# Patient Record
Sex: MALE | Race: BLACK OR AFRICAN AMERICAN | Employment: PART TIME | URBAN - METROPOLITAN AREA
[De-identification: names, ages, dates, MRNs, and addresses within clinical notes are randomized per-mention and may not be internally consistent; named-entity substitution may affect disease eponyms.]

---

## 2021-10-21 ENCOUNTER — HOSPITAL ENCOUNTER (EMERGENCY)
Age: 66
Discharge: HOME | End: 2021-10-21
Attending: EMERGENCY MEDICINE
Payer: MEDICARE

## 2021-10-21 ENCOUNTER — APPOINTMENT (OUTPATIENT)
Dept: ULTRASOUND IMAGING | Age: 66
End: 2021-10-21
Attending: EMERGENCY MEDICINE
Payer: MEDICARE

## 2021-10-21 VITALS
WEIGHT: 308.42 LBS | SYSTOLIC BLOOD PRESSURE: 165 MMHG | HEART RATE: 65 BPM | HEIGHT: 66 IN | TEMPERATURE: 98.2 F | BODY MASS INDEX: 49.57 KG/M2 | OXYGEN SATURATION: 97 % | DIASTOLIC BLOOD PRESSURE: 93 MMHG | RESPIRATION RATE: 20 BRPM

## 2021-10-21 DIAGNOSIS — M79.604 ACUTE LEG PAIN, RIGHT: Primary | ICD-10-CM

## 2021-10-21 DIAGNOSIS — M79.89 RIGHT LEG SWELLING: ICD-10-CM

## 2021-10-21 LAB
GLUCOSE BLD STRIP.AUTO-MCNC: 88 MG/DL (ref 65–117)
SERVICE CMNT-IMP: NORMAL

## 2021-10-21 PROCEDURE — 93971 EXTREMITY STUDY: CPT

## 2021-10-21 PROCEDURE — 99283 EMERGENCY DEPT VISIT LOW MDM: CPT

## 2021-10-21 PROCEDURE — 82962 GLUCOSE BLOOD TEST: CPT

## 2021-10-21 RX ORDER — PREDNISONE 10 MG/1
60 TABLET ORAL
Qty: 27 TABLET | Refills: 0 | Status: SHIPPED | OUTPATIENT
Start: 2021-10-21

## 2021-10-21 NOTE — ED PROVIDER NOTES
EMERGENCY DEPARTMENT HISTORY AND PHYSICAL EXAM      Date: 10/21/2021  Patient Name: Yessi Giles    History of Presenting Illness     Chief Complaint   Patient presents with    Leg Pain     Right leg pain and swelling for a couple of years that has gotten worse. He went to see his PCP and she gave him some cream to rub on his legs but it isn't helping. History Provided By: Patient    HPI: Alvaro Giles, 73 y.o. male presents to the ED with complaint of ongoing right leg pain and swelling for a couple years now that is progressively gotten worse. He says he has been to a number of doctors but there remedies have not helped resolve his issue. He denies any fever, any new leg trauma and says that the right leg is persistently been swollen and painful. He drove a long-haul truck for approximately 47 years and said that his pain and swelling in his leg progressively worse on the right side. He also said he had a distant fall onto the right buttock and has had some pain in his leg ever since. He says he has previously been evaluated for blood clot but has been negative at that time and is no personal or family history of PE. He does not know of any circulation issues in his leg. He is using a cane now to help take some of the weight off his leg as he walks to help with the pain. There are no other complaints, changes, or physical findings at this time. PCP: David Edwrads MD    No current facility-administered medications on file prior to encounter. No current outpatient medications on file prior to encounter. Past History     Past Medical History:  No past medical history on file. Past Surgical History:  No past surgical history on file. Family History:  No family history on file.     Social History:  Social History     Tobacco Use    Smoking status: Not on file   Substance Use Topics    Alcohol use: Not on file    Drug use: Not on file       Allergies:  No Known Allergies      Review of Systems   Review of Systems   Constitutional: Negative for activity change and appetite change. HENT: Negative. Respiratory: Negative for shortness of breath. Cardiovascular: Negative for chest pain. Gastrointestinal: Negative for abdominal pain. Genitourinary: Negative for difficulty urinating. Musculoskeletal: Negative for back pain and neck pain. Neurological: Negative for dizziness and light-headedness. Hematological: Negative for adenopathy. All other systems reviewed and are negative. Physical Exam   Physical Exam   Vital signs and nursing notes reviewed    CONSTITUTIONAL: Alert, in mild distress; well-developed; well-nourished. Sitting in wheelchair with cane on his side. Greater than 30. HEAD:  Normocephalic, atraumatic  EYES: PERRL; EOM's intact. ENTM: Nose: no rhinorrhea; Throat: no erythema or exudate, mucous membranes moist  Neck:  Supple. trachea is midline. No JVD. RESP: Chest clear, equal breath sounds. - W/R/R  CV: S1 and S2 WNL; No murmurs, gallops or rubs. 2+ radial and DP pulses bilaterally. GI: non-distended, normal bowel sounds, abdomen soft and non-tender. No masses or organomegaly. : No costo-vertebral angle tenderness. BACK:  Non-tender, normal appearance  UPPER EXT:  Normal inspection. no joint or soft tissue swelling  LOWER EXT: Left leg with 1+ edema, right leg with 2+ edema, no overlying redness or induration, hair loss in the lower one third of the lower extremity, intact 2+ DP pulse, no joint deformity or effusions noted in the foot ankle or knee. No focal tenderness in the calf or palpable subcu air. Negative straight leg test.  NEURO: Alert and oriented x3, 5/5 strength and light touch sensation intact in bilateral upper and lower extremities. SKIN: No rashes;  Warm and dry  PSYCH: Normal mood, normal affect    Diagnostic Study Results     Labs -     Recent Results (from the past 12 hour(s))   GLUCOSE, POC    Collection Time: 10/21/21 12:32 PM   Result Value Ref Range    Glucose (POC) 88 65 - 117 mg/dL    Performed by Cassie Bolaños        Radiologic Studies -   DUPLEX LOWER EXT VENOUS RIGHT   Final Result negative for DVT. CT Results  (Last 48 hours)    None        CXR Results  (Last 48 hours)    None          Medical Decision Making   I am the first provider for this patient. I reviewed the vital signs, available nursing notes, past medical history, past surgical history, family history and social history. Vital Signs-Reviewed the patient's vital signs. Patient Vitals for the past 12 hrs:   Temp Pulse Resp BP SpO2   10/21/21 1129 98.2 °F (36.8 °C) 65 20 (!) 165/93 97 %   10/21/21 1124     98 %   10/21/21 1055 98.2 °F (36.8 °C) 74 18 (!) 180/95 95 %         Records Reviewed: Nursing Notes    Provider Notes (Medical Decision Making):   67-year-old male with acute on chronic right leg pain. Differential includes venous insufficiency, early arterial insufficiency but with no red flags for vascular emergency, rule out DVT, not suspicious for gout, sciatica, neuropathy. DVT study here is negative and patient screens negative for diabetes on point-of-care glucose check. Patient would benefit both from her neurology follow-up which she does have planned already in February and can try a short course of prednisone to reduce inflammation. Discussed compression stockings, close follow-up with primary care doctor as may need work-up for neuropathy which can be started in the primary care space. ED Course:   Initial assessment performed. The patients presenting problems have been discussed, and they are in agreement with the care plan formulated and outlined with them. I have encouraged them to ask questions as they arise throughout their visit. Disposition:  Discharge    Discharge Note:  2:07 PM  The pt is ready for discharge.  The pt's signs, symptoms, diagnosis, and discharge instructions have been discussed and pt has conveyed their understanding. The pt is to follow up as recommended or return to ER should their symptoms worsen. Plan has been discussed and pt is in agreement. DISCHARGE PLAN:  1. Discharge Medication List as of 10/21/2021  2:07 PM        2. Follow-up Information     Follow up With Specialties Details Why Contact Info    [facility] EMERGENCY DEPT Emergency Medicine  If symptoms worsen including fever, uncontrolled pain or other new concerning symptoms. [address] [place] [address]  [phone]        3. Return to ED if worse     Diagnosis     Clinical Impression:   1. Acute leg pain, right    2. Right leg swelling        Attestations:    Sourav Alarcon MD    Please note that this dictation was completed with [employer], the computer voice recognition software. Quite often unanticipated grammatical, syntax, homophones, and other interpretive errors are inadvertently transcribed by the computer software. Please disregard these errors. Please excuse any errors that have escaped final proofreading. Thank you.

## 2021-10-21 NOTE — DISCHARGE INSTRUCTIONS
You were seen here in the emergency department for ongoing right leg pain and swelling. Your evaluation here was thankfully reassuring with no evidence of a dangerous DVT/blood clot in your right leg. Your blood sugar was also good today at 88. This is normal.  As we discussed, the pain in your leg could be from neuropathy, the presence of chronic swelling from veins that are not working properly, along with some potential of decreased blood flow to your leg though I am less suspicious for this. Your pain could also be from nerves traveling down your leg and to help with this I am sending a prescription for prednisone to your local pharmacy. Please take as directed for the next 7 days. You for letting us take care of you and be sure to follow-up with your primary care doctor as well as your planned neurology appointment. You can always call them to see if they have any cancellations he can get in sooner.

## 2021-10-21 NOTE — ED NOTES
Patient complains of right lower leg pain that is burning and is \"on fire\". Patient denies injuring himself. No visible wounds. Right leg is slightly more swollen than the left leg.

## 2022-02-10 ENCOUNTER — OFFICE VISIT (OUTPATIENT)
Dept: NEUROLOGY | Age: 67
End: 2022-02-10
Payer: MEDICARE

## 2022-02-10 VITALS
WEIGHT: 315 LBS | DIASTOLIC BLOOD PRESSURE: 95 MMHG | SYSTOLIC BLOOD PRESSURE: 180 MMHG | HEART RATE: 68 BPM | BODY MASS INDEX: 50.62 KG/M2 | HEIGHT: 66 IN | OXYGEN SATURATION: 97 % | TEMPERATURE: 98.1 F

## 2022-02-10 DIAGNOSIS — G62.9 NEUROPATHY: Primary | ICD-10-CM

## 2022-02-10 PROBLEM — M48.062 SPINAL STENOSIS OF LUMBAR REGION WITH NEUROGENIC CLAUDICATION: Status: ACTIVE | Noted: 2022-02-10

## 2022-02-10 PROBLEM — R60.9 PERIPHERAL EDEMA: Status: ACTIVE | Noted: 2021-03-22

## 2022-02-10 PROBLEM — I10 PRIMARY HYPERTENSION: Status: ACTIVE | Noted: 2022-02-10

## 2022-02-10 PROBLEM — M19.90 ARTHRITIS: Status: ACTIVE | Noted: 2022-02-10

## 2022-02-10 PROBLEM — Z86.69 HISTORY OF SCIATICA: Status: ACTIVE | Noted: 2022-02-10

## 2022-02-10 PROCEDURE — 99205 OFFICE O/P NEW HI 60 MIN: CPT | Performed by: PSYCHIATRY & NEUROLOGY

## 2022-02-10 RX ORDER — LISINOPRIL 40 MG/1
40 TABLET ORAL DAILY
COMMUNITY
Start: 2022-01-08

## 2022-02-10 RX ORDER — DULOXETIN HYDROCHLORIDE 30 MG/1
30 CAPSULE, DELAYED RELEASE ORAL 2 TIMES DAILY
Qty: 60 CAPSULE | Refills: 5 | Status: SHIPPED | OUTPATIENT
Start: 2022-02-10 | End: 2022-08-09 | Stop reason: SDUPTHER

## 2022-02-10 RX ORDER — FUROSEMIDE 40 MG/1
40 TABLET ORAL DAILY
COMMUNITY

## 2022-02-10 RX ORDER — DICLOFENAC SODIUM 10 MG/G
GEL TOPICAL
COMMUNITY

## 2022-02-10 RX ORDER — METOPROLOL SUCCINATE 50 MG/1
50 TABLET, EXTENDED RELEASE ORAL DAILY
COMMUNITY
Start: 2022-01-29

## 2022-02-10 NOTE — ASSESSMENT & PLAN NOTE
Patient presents with neuropathic pain right lower extremity consistent with peroneal nerve distribution.     He also has fairly significant lumbar stenosis is noted on MRI scan    He has failed multiple medications however I think he had unrealistic expectations of outcomes  We discussed realistic expectation of outcomes regarding medication management to include improved rest at night as well as ability to get through normal daily activities without the pain being forefront    We will start Cymbalta 30 mg titrating to 1 twice daily we can certainly go up higher but I like to make sure he is having no side effects and he may actually get good results with the lower dosing    EMG has been ordered to more fully assess the pathology we may need to repeat MRI scan depending those results and possibly repeat blood work but he had fairly thorough blood work done in 2021 as part of his work-up without significant findings

## 2022-02-10 NOTE — PATIENT INSTRUCTIONS
As per discussion    I want you to start a medication called Cymbalta I sent a prescription to your pharmacy start with 1 tablet a day for 2 weeks and if no significant improvement in your pain then go up to 1 tablet twice a day    Remember the goal of treatment is not to get rid and but to make it more tolerable she can get a better night sleep and you can go through your normal activities without it being overwhelming to you that it is more like background noise    EMG testing will be set up with Dr. Rashi Mercado here in the office    Once I get the information about your MRI scanning your Doppler studies and complete the full picture together we can see what is going on    For right now we will get a focus on symptomatic therapy    I strongly encourage you to use my chart if you need to contact us. Presently with a high utilization of telehealth it is very difficult to get through on her phone lines. If you have not already activated my chart or you forget your password their instructions in this packet to get my chart activated. Office Policies    o Phone calls/patient messages:  Please allow up to 24 hours for someone in the office to contact you about your call or message. Be mindful your provider may be out of the office or your message may require further review. We encourage you to use [facility] for your messages as this is a faster, more efficient way to communicate with our office    o Medication Refills:  Prescription medications require up to 48 business hours to process. We encourage you to use [facility] for your refills. For controlled medications: Please allow up to 72 business hours to process. Certain medications may require you to  a written prescription at our office. NO narcotic/controlled medications will be prescribed after 4pm Monday through Friday or on weekends    o Form/Paperwork Completion:  We ask that you allow 7-14 business days.  You may also download your forms to [facility] to have your doctor print off.

## 2022-02-10 NOTE — PROGRESS NOTES
[facility]  In Office NEW Pt VISIT         Ryland Criss Dakins is a 73 y.o. male who presents today for the following:  Chief Complaint   Patient presents with    New Patient     right leg from knee down to foot, burning    Leg Problem    Tingling         ASSESSMENT AND PLAN    1. Neuropathy  Assessment & Plan:   Patient presents with neuropathic pain right lower extremity consistent with peroneal nerve distribution. He also has fairly significant lumbar stenosis is noted on MRI scan    He has failed multiple medications however I think he had unrealistic expectations of outcomes  We discussed realistic expectation of outcomes regarding medication management to include improved rest at night as well as ability to get through normal daily activities without the pain being forefront    We will start Cymbalta 30 mg titrating to 1 twice daily we can certainly go up higher but I like to make sure he is having no side effects and he may actually get good results with the lower dosing    EMG has been ordered to more fully assess the pathology we may need to repeat MRI scan depending those results and possibly repeat blood work but he had fairly thorough blood work done in 2021 as part of his work-up without significant findings      Orders:  -     DULoxetine (CYMBALTA) 30 mg capsule; Take 1 Capsule by mouth two (2) times a day. Indications: neuropathic pain, Normal, Disp-60 Capsule, R-5  -     REFERRAL TO NEUROLOGY IN CLINIC PROCEDURES    Patient and/or family was given time to ask questions and voice concerns. I believe all questions concerns were adequately addressed at this  office visit.   Patient and/or family also verbalized agreement and understanding of the above-stated plan    This remains a complex difficult case due to poor outcomes in the past and being seen by multiple providers      Follow-up and Dispositions    · Return in about 6 months (around 8/10/2022) for In office appointment. ICD-10-CM ICD-9-CM    1. Neuropathy  G62.9 355.9 DULoxetine (CYMBALTA) 30 mg capsule      REFERRAL TO NEUROLOGY IN CLINIC PROCEDURES         I attest that 60 minutes was spent on today's visit reviewing medical records and diagnostic testing deemed pertinent to this patient's care, along with direct time spent at patient's visit including the history, physical assessment and plan, discussing diagnosis and management along with documentation.       HPI    Patient was referred to the practice for the following reason[s]:  Referred for PCP  Burning in leg RT knee down to foot    Patient is accompanied by: wife  History is obtained predominantly by: Self; patient also has a duplicate chart that needs to be merged I was able to go into the duplicate chart and reviewed data [a request has been put into the system to merge the charts]    Onset: about 3 years ago   progressing  Quality: burning numbness tingling and swelling    Has tried:   Gabapentin  Lyrica  voltaren gel  Elavil  Tylenol    Has not tried  cymbalta  Carbamazepine    Patient was under the assumption that the medications were to make his symptoms go completely away    He has trouble sleeping at night due to the pain and discomfort and oftentimes singling psychological came are enjoying a meal is interrupted due to his discomfort    He also states he was walking on a daily basis and losing weight but cannot do that because of this discomfort    He states he has had MRI scan as well as duplex studies all of which were normal I do not have access to these we will try to get medical release for those records    It appears he has been seen numerous times in the ED for this problem along with being followed by pain management with Dr. Monica Magallon were also reviewed from his duplicate chart to include inflammatory indicators, blood sugar levels, and routine labs all of which were unremarkable    Other significant comorbid conditions/concerns  Lumbar stenosis  Hypertension  Peripheral edema      Pertinent diagnostic data  2/9/2021 11:54 am    REASON FOR EXAM:  Unspecified mononeuropathy of right lower limb    REPORT:  An MRI of the lumbar spine demonstrates the vertebral body heights and posterior alignments to be satisfactory. Vertebral bodies maintain their normal marrow signals. Intervertebral discs maintain their normal signals. Conus appears within normal limits ending at the L1 level. Prominent epidural fat is demonstrated within the lumbar spine particularly from L4 through S1 anteriorly. At L1-L2, at L1-L2 and L2-L3, no canal narrowing is evident. Mild foraminal narrowing bilaterally at L2-L3. At L3-L4, mild facet hypertrophy demonstrated with trace fluid in the facet joints. Mild disc bulge evident. Mild canal narrowing. Mild foraminal narrowing bilaterally. Prominent anterior epidural fat demonstrated posterior to the L4 vertebral body level. Facet hypertrophy present with thickening of the ligamentum flavum. The changes create at least moderate concentric canal stenosis at L4-L5. Disc bulge evident within the foramina bilaterally with moderate to severe foraminal stenosis present bilaterally. At L5-S1, prominent facet arthrosis demonstrated. Fluid demonstrated in the facet joint on the left. Broad-based disc bulge demonstrated. Moderate concentric canal stenosis demonstrated. Moderate bilateral foraminal stenosis. IMPRESSION:  Prominent epidural fat in the lower lumbar spine with disc bulges and posterior element hypertrophy. Moderate concentric canal stenosis at L4-L5 and L5-S1. Moderate to severe bilateral foraminal stenosis at L4-L5 with moderate bilateral foraminal narrowing at L5-S1. Some fluid is evident in the facet joint on the left at L5-S1. No results found for this or any previous visit.             Not on File    Current Outpatient Medications   Medication Sig    furosemide (Lasix) 40 mg tablet Take 40 mg by mouth daily.  diclofenac (Voltaren) 1 % gel Apply  to affected area daily as needed for Pain.  DULoxetine (CYMBALTA) 30 mg capsule Take 1 Capsule by mouth two (2) times a day. Indications: neuropathic pain    lisinopriL (PRINIVIL, ZESTRIL) 40 mg tablet Take 40 mg by mouth daily.  metoprolol succinate (TOPROL-XL) 50 mg XL tablet Take 50 mg by mouth daily. No current facility-administered medications for this visit. Past medical history/surgical history, family history, and social history have been reviewed for today's visit      ROS    A ten system review of constitutional, cardiovascular, respiratory, musculoskeletal, endocrine, skin, SHEENT, genitourinary, psychiatric and neurologic systems was obtained and is unremarkable except as mentioned under HPI          EXAMINATION:     Visit Vitals  BP (!) 180/95   Pulse 68   Temp 98.1 °F (36.7 °C) (Temporal)   Ht 5' 6\" (1.676 m)   Wt 319 lb 9.6 oz (145 kg)   SpO2 97%   BMI 51.58 kg/m²         General appearance: Patient is well-developed and well-nourished in no apparent distress and well groomed. Psych/mental health:  Affect: Appropriate    PHQ  No flowsheet data found. HEENT:   Normocephalic  With evidence of trauma: No  Full range of motion head neck: Yes  Tenderness to palpation of the head neck region: No      Cardiovascular:     Extremities warm to touch:Yes  Extremity swelling: Bilateral peripheral edema lower extremities right greater than left; nonpitting  Discoloration: No  Evidence of PVD: No    Respiratory:   Dyspnea on exertion: No   Abnormal effort on casual observation: No   Use of portable oxygen: No   Evidence of cyanosis: No     Musculoskeletal:   Evidence of significant bone deformities: No   Spinal curvature: No     Integumentary:    Obvious bruising: No   Lacerations or discoloration on casual observation: No       Neurological Examination:   Mental Status:        MMSE  No flowsheet data found.      Formal testing was not completed    there was nothing concerning on general observation and discussion. Alert oriented and appropriate to general conversation  Normal processing on general observation  Followed conversation and responded seemingly appropriate throughout the office visit  No word finding difficulties noted on casual observation  Able to follow directions without difficulty     Cranial Nerves:    Grossly intact    Motor:   Normal bulk and tone  No tremor appreciated on today's exam  No abnormal movements appreciated on today's exam  Moves extremities spontaneously and with purpose  5/5 x 4      Sensation: Intact to light touch and vibration    Coordination/Cerebellar:   Grossly intact    Gait: Ambulates with the use of a cane    Reflexes: +2 patella toes downgoing bilaterally    Fall risk assessment  No flowsheet data found.             Marcellus Don MS, ANP-BC, [facility]

## 2022-03-18 PROBLEM — M19.90 ARTHRITIS: Status: ACTIVE | Noted: 2022-02-10

## 2022-03-18 PROBLEM — Z86.69 HISTORY OF SCIATICA: Status: ACTIVE | Noted: 2022-02-10

## 2022-03-18 PROBLEM — G62.9 NEUROPATHY: Status: ACTIVE | Noted: 2022-02-10

## 2022-03-18 PROBLEM — M48.062 SPINAL STENOSIS OF LUMBAR REGION WITH NEUROGENIC CLAUDICATION: Status: ACTIVE | Noted: 2022-02-10

## 2022-03-19 PROBLEM — R60.0 PERIPHERAL EDEMA: Status: ACTIVE | Noted: 2021-03-22

## 2022-03-19 PROBLEM — R60.9 PERIPHERAL EDEMA: Status: ACTIVE | Noted: 2021-03-22

## 2022-03-19 PROBLEM — I10 PRIMARY HYPERTENSION: Status: ACTIVE | Noted: 2022-02-10

## 2022-04-13 ENCOUNTER — OFFICE VISIT (OUTPATIENT)
Dept: NEUROLOGY | Age: 67
End: 2022-04-13
Payer: MEDICARE

## 2022-04-13 DIAGNOSIS — Z86.69 HISTORY OF SCIATICA: ICD-10-CM

## 2022-04-13 DIAGNOSIS — M47.22 CERVICAL RADICULOPATHY DUE TO DEGENERATIVE JOINT DISEASE OF SPINE: ICD-10-CM

## 2022-04-13 DIAGNOSIS — G57.31 RIGHT PERONEAL MONONEUROPATHY: ICD-10-CM

## 2022-04-13 DIAGNOSIS — M54.16 LUMBAR BACK PAIN WITH RADICULOPATHY AFFECTING RIGHT LOWER EXTREMITY: ICD-10-CM

## 2022-04-13 DIAGNOSIS — M48.062 SPINAL STENOSIS OF LUMBAR REGION WITH NEUROGENIC CLAUDICATION: ICD-10-CM

## 2022-04-13 DIAGNOSIS — G62.9 NEUROPATHY: ICD-10-CM

## 2022-04-13 DIAGNOSIS — G56.01 CARPAL TUNNEL SYNDROME OF RIGHT WRIST: Primary | ICD-10-CM

## 2022-04-13 PROCEDURE — 95886 MUSC TEST DONE W/N TEST COMP: CPT | Performed by: PSYCHIATRY & NEUROLOGY

## 2022-04-13 PROCEDURE — 95913 NRV CNDJ TEST 13/> STUDIES: CPT | Performed by: PSYCHIATRY & NEUROLOGY

## 2022-04-13 NOTE — PROCEDURES
ELECTRODIAGNOSTIC REPORT      Test Date:  3/31/2022    Patient: Tracee Ervin : 1955 Physician: Aileen Wilson M.D. Sex: Male  < Ref Phys:      Technician: Denis Snow    Patient History; patient with numbness in his right hand, and severe burning pain down his right lower extremity. Neuro Exam: Patient has normal strength and muscle bulk and tone in all muscles tested except for the right abductor pollicis brevis which shows slight atrophy. Patient has intact sensation to all modalities in all extremities to pin temperature and double simultaneous stimulation. Deep tendon reflexes are hypoactive but symmetric throughout and no Babinski or clonus is present. Patient's gait and station is normal.  Mental status and cranial nerves II through XII are intact. EMG report: This study shows electrophysiologic evidence of a moderate severe right distal median nerve entrapment at the wrist, consistent with a moderate severe carpal tunnel syndrome, as manifest by the unobtainable motor and sensory conduction velocities of the right median nerve and the acute and chronic denervation potentials in polyphasic units seen in the right abductor pollicis brevis. There was no clear evidence of lumbar radiculopathy or peroneal neuropathy on the basis of this test in the right lower extremity. There was no clear evidence of cervical radiculopathy. Clinical correlation recommended, and correlation with imaging modalities and metabolic studies may be of further diagnostic benefit if clinically indicated. EMG & NCV Findings:  Evaluation of the left Fibular motor and the right Fibular motor nerves showed normal distal onset latency (L3.2, R3.2 ms), normal amplitude (L5.4, R4.5 mV), normal conduction velocity (B Fib-Ankle, L63, R55 m/s), and normal conduction velocity (Poplt-B Fib, L53, R56 m/s). The right median motor nerve showed no response (Wrist) and no response (Elbow).   The left tibial motor and the right tibial motor nerves showed normal distal onset latency (L3.0, R3.0 ms), normal amplitude (L6.5, R8.2 mV), and normal conduction velocity (Knee-Ankle, L42, R41 m/s). The right ulnar motor nerve showed normal distal onset latency (3.0 ms), normal amplitude (8.2 mV), decreased conduction velocity (Wrist-Abd Dig Minimi, 27 m/s), normal conduction velocity (B Elbow-Wrist, 61 m/s), and normal conduction velocity (A Elbow-B Elbow, 56 m/s). The right median sensory nerve showed no response (Wrist). The right radial sensory, the left sural sensory, the right sural sensory, and the right ulnar sensory nerves showed normal distal peak latency (R2.0, L2.6, R2.7, R2.7 ms) and normal amplitude (R31.0, L11.1, R13.9, R15.0 µV). The left Sup Fibular sensory and the right Sup Fibular sensory nerves showed normal distal peak latency (L2.3, R2.5 ms), normal amplitude (L15.1, R8.8 µV), and normal conduction velocity (Lower leg-Lat ankle, L63, R59 m/s). The right median/ulnar (palm) comparison nerve showed no response (Median Palm) and no response (Ulnar Palm).                 __________________  Chacha Umanzor MD        Nerve Conduction Studies  Anti Sensory Summary Table     Stim Site NR Onset (ms) Peak (ms) O-P Amp (µV) Norm Peak (ms) Norm O-P Amp Site1 Site2 Dist (cm) Norm Bryant (m/s)   Right Median Anti Sensory (2nd Digit)  33°C   Wrist NR    <4 >11 Wrist 2nd Digit 14.0    Right Radial Anti Sensory (Base 1st Digit)  33°C   Wrist    1.4 2.0 31.0 <2.9 >15 Wrist Base 1st Digit 10.0    Left Sup Fibular Anti Sensory (Lat ankle)  33°C   Lower leg    1.6 2.3 15.1 <4.4 >5.0 Lower leg Lat ankle 10.0 >32   Right Sup Fibular Anti Sensory (Lat ankle)  33°C   Lower leg    1.7 2.5 8.8 <4.4 >5.0 Lower leg Lat ankle 10.0 >32   Left Sural Anti Sensory (Lat Mall)  33°C   Calf    2.0 2.6 11.1 <4.5 >4.0 Calf Lat Mall 14.0    Right Sural Anti Sensory (Lat Mall)  33°C   Calf    1.8 2.7 13.9 <4.5 >4.0 Calf Lat Mall 14.0    Site 2    2.3 2.9 7.7 Right Ulnar Anti Sensory (5th Digit)  33°C   Wrist    2.3 2.7 15.0 <4.0 >10 Wrist 5th Digit 14.0      Motor Summary Table     Stim Site NR Onset (ms) Norm Onset (ms) O-P Amp (mV) Norm O-P Amp P-T Amp (mV) Site1 Site2 Dist (cm) Bryant (m/s)   Left Fibular Motor (Ext Dig Brev)  33°C   Ankle    3.2 <6.5 5.4 >1.1  Ankle Ext Dig Brev 8.0 25   B Fib    8.9  4.8   B Fib Ankle 36.0 63   Poplt    10.8  4.8   Poplt B Fib 10.0 53   Right Fibular Motor (Ext Dig Brev)  33°C   Ankle    3.2 <6.5 4.5 >1.1  Ankle Ext Dig Brev 8.0 25   B Fib    9.8  3.5   B Fib Ankle 36.0 55   Poplt    11.6  3.5   Poplt B Fib 10.0 56   Right Median Motor (Abd Poll Brev)  33°C   Wrist NR  <4.5  >4.1  Wrist Abd Poll Brev 8.0    Elbow NR      Elbow Wrist 0.0    Left Tibial Motor (Abd Curry Brev)  33°C   Ankle    3.0 <6.1 6.5 >4.4  Ankle Abd Curry Brev 8.0 27   Knee    12.5  4.0   Knee Ankle 40.0 42   Right Tibial Motor (Abd Curry Brev)  33°C   Ankle    3.0 <6.1 8.2 >4.4  Ankle Abd Curry Brev 8.0 27   Knee    12.4  4.1   Knee Ankle 39.0 41   Right Ulnar Motor (Abd Dig Minimi)  33°C   Wrist    3.0 <3.1 8.2 >7.0  Wrist Abd Dig Minimi 8.0 27   B Elbow    6.3  7.7   B Elbow Wrist 20.0 61   A Elbow    8.1  7.2   A Elbow B Elbow 10.0 56     Comparison Summary Table     Stim Site NR Peak (ms) P-T Amp (µV) Site1 Site2 Dist (cm) Delta-P (ms)   Right Median/Ulnar Palm Comparison (Wrist)  33°C   Median Palm NR   Median Palm Ulnar Palm 8.0    Ulnar Palm NR           EMG     Side Muscle Nerve Root Ins Act Fibs Psw Recrt Duration Amp Poly Comment   Right MedGastroc Tibial S1-2 Nml Nml Nml Nml Nml Nml Nml    Left MedGastroc Tibial S1-2 Nml Nml Nml Nml Nml Nml Nml    Right BicepsFemL Sciatic L5-S2 Nml Nml Nml Nml Nml Nml Nml    Left BicepsFemL Sciatic L5-S2 Nml Nml Nml Nml Nml Nml Nml    Left Lower Lumb Parasp Rami L5,S1 Nml Nml Nml Nml Nml Nml Nml    Right Lower Lumb Parasp Rami L5,S1 Nml Nml Nml Nml Nml Nml Nml    Right Ext Dig Brev Dp Br Peron L5, S1 Nml Nml Nml Nml Nml Nml Nml    Left Ext Dig Brev Dp Br Peron L5, S1 Nml Nml Nml Nml Nml Nml Nml    Left AntTibialis Dp Br Peron L4-5 Nml Nml Nml Nml Nml Nml Nml    Right AntTibialis Dp Br Peron L4-5 Nml Nml Nml Nml Nml Nml Nml    Left VastusLat Femoral L2-4 Nml Nml Nml Nml Nml Nml Nml    Right VastusLat Femoral L2-4 Nml Nml Nml Nml Nml Nml Nml    Right 1stDorInt Ulnar C8-T1 Nml Nml Nml Nml Nml Nml Nml    Right Abd Poll Brev Median C8-T1 Incl 2+ 2+ Reduced Incr Incr 2+    Right FlexPolLong Median (Ant Int) C7-8 Nml Nml Nml Nml Nml Nml Nml    Right Lower Cerv Parasp Rami C7,T1 Nml Nml Nml Nml Nml Nml Nml    Right Triceps Radial C6-7-8 Nml Nml Nml Nml Nml Nml Nml    Right Deltoid Axillary C5-6 Nml Nml Nml Nml Nml Nml Nml    Right Biceps Musculocut C5-6 Nml Nml Nml Nml Nml Nml Nml    Right Peroneus Long   Nml Nml Nml Nml Nml Nml Nml    Left Peroneus Long   Nml Nml Nml Nml Nml Nml Nml          Waveforms:

## 2022-04-13 NOTE — PROGRESS NOTES
Patient's EMG study showed no evidence of lumbar radiculopathy or peroneal nerve compression or neuropathy, but did show a moderate severe right carpal tunnel syndrome. Patient referred to Dr. Shruthi Rosado for decompressive surgery of his right carpal tunnel syndrome.   Patient to follow-up with nurse practitioner Colby Calderon about his painful right leg, and he is already tried Neurontin and Lyrica and is currently on Cymbalta 30 mg twice a day with slight provement

## 2022-04-13 NOTE — LETTER
4/13/2022 3:37 PM    Patient:  Alvaro Alexander Conception   YOB: 1955  Date of Visit: 4/13/2022      Dear   No Recipients: Thank you for referring Mr. Tiara Lee to me for evaluation/treatment. Below are the relevant portions of my assessment and plan of care. Patient's EMG study showed no evidence of lumbar radiculopathy or peroneal nerve compression or neuropathy, but did show a moderate severe right carpal tunnel syndrome. Patient referred to Dr. Rupinder Mayes for decompressive surgery of his right carpal tunnel syndrome. Patient to follow-up with nurse practitioner Behzad Vincent about his painful right leg, and he is already tried Neurontin and Lyrica and is currently on Cymbalta 30 mg twice a day with slight provement        If you have questions, please do not hesitate to call me. I look forward to following Mr. Giles along with you.         Sincerely,      [facility]

## 2022-08-09 DIAGNOSIS — G62.9 NEUROPATHY: ICD-10-CM

## 2022-08-09 NOTE — TELEPHONE ENCOUNTER
Pt called for refill of:    Requested Prescriptions     Pending Prescriptions Disp Refills    DULoxetine (CYMBALTA) 30 mg capsule 60 Capsule 5     Sig: Take 1 Capsule by mouth two (2) times a day. Indications: neuropathic pain     States he is doing great on medication.     Please send to [address] [facility] in [address]: 2/10/2022  NOV: 8/18/2022

## 2022-08-12 RX ORDER — DULOXETIN HYDROCHLORIDE 30 MG/1
30 CAPSULE, DELAYED RELEASE ORAL 2 TIMES DAILY
Qty: 60 CAPSULE | Refills: 5 | Status: SHIPPED | OUTPATIENT
Start: 2022-08-12

## 2023-03-24 DIAGNOSIS — G62.9 NEUROPATHY: ICD-10-CM

## 2023-03-24 RX ORDER — DULOXETIN HYDROCHLORIDE 30 MG/1
CAPSULE, DELAYED RELEASE ORAL
Qty: 60 CAPSULE | Refills: 3 | Status: SHIPPED | OUTPATIENT
Start: 2023-03-24

## 2023-03-24 NOTE — TELEPHONE ENCOUNTER
I given the patient a renewal on his medication as well as several refills but he needs to be seen for med check before any further prescriptions will be given.   Please put him on JOYCE Castellanos's schedule for med check

## 2023-08-10 RX ORDER — CLONIDINE HYDROCHLORIDE 0.1 MG/1
0.1 TABLET ORAL NIGHTLY
COMMUNITY

## 2023-08-11 ENCOUNTER — ANESTHESIA EVENT (OUTPATIENT)
Facility: HOSPITAL | Age: 68
End: 2023-08-11
Payer: MEDICARE

## 2023-08-11 ENCOUNTER — ANESTHESIA (OUTPATIENT)
Facility: HOSPITAL | Age: 68
End: 2023-08-11
Payer: MEDICARE

## 2023-08-11 ENCOUNTER — HOSPITAL ENCOUNTER (OUTPATIENT)
Facility: HOSPITAL | Age: 68
Setting detail: OUTPATIENT SURGERY
Discharge: HOME | End: 2023-08-11
Attending: INTERNAL MEDICINE | Admitting: INTERNAL MEDICINE
Payer: MEDICARE

## 2023-08-11 VITALS
OXYGEN SATURATION: 100 % | HEIGHT: 66 IN | DIASTOLIC BLOOD PRESSURE: 87 MMHG | WEIGHT: 299.6 LBS | TEMPERATURE: 97.9 F | RESPIRATION RATE: 18 BRPM | BODY MASS INDEX: 48.15 KG/M2 | HEART RATE: 53 BPM | SYSTOLIC BLOOD PRESSURE: 187 MMHG

## 2023-08-11 PROCEDURE — 6360000002 HC RX W HCPCS: Performed by: INTERNAL MEDICINE

## 2023-08-11 PROCEDURE — 3700000000 HC ANESTHESIA ATTENDED CARE: Performed by: INTERNAL MEDICINE

## 2023-08-11 PROCEDURE — 3600007502: Performed by: INTERNAL MEDICINE

## 2023-08-11 PROCEDURE — 2580000003 HC RX 258: Performed by: INTERNAL MEDICINE

## 2023-08-11 PROCEDURE — 2500000003 HC RX 250 WO HCPCS: Performed by: ANESTHESIOLOGY

## 2023-08-11 PROCEDURE — 88305 TISSUE EXAM BY PATHOLOGIST: CPT

## 2023-08-11 PROCEDURE — 3700000001 HC ADD 15 MINUTES (ANESTHESIA): Performed by: INTERNAL MEDICINE

## 2023-08-11 PROCEDURE — 7100000010 HC PHASE II RECOVERY - FIRST 15 MIN: Performed by: INTERNAL MEDICINE

## 2023-08-11 PROCEDURE — 7100000011 HC PHASE II RECOVERY - ADDTL 15 MIN: Performed by: INTERNAL MEDICINE

## 2023-08-11 PROCEDURE — 6360000002 HC RX W HCPCS: Performed by: ANESTHESIOLOGY

## 2023-08-11 PROCEDURE — 2720000010 HC SURG SUPPLY STERILE: Performed by: INTERNAL MEDICINE

## 2023-08-11 PROCEDURE — C1889 IMPLANT/INSERT DEVICE, NOC: HCPCS | Performed by: INTERNAL MEDICINE

## 2023-08-11 PROCEDURE — 2709999900 HC NON-CHARGEABLE SUPPLY: Performed by: INTERNAL MEDICINE

## 2023-08-11 PROCEDURE — 3600007512: Performed by: INTERNAL MEDICINE

## 2023-08-11 DEVICE — CLIP
Type: IMPLANTABLE DEVICE | Site: DESCENDING COLON | Status: FUNCTIONAL
Brand: RESOLUTION 360™ ULTRA CLIP

## 2023-08-11 DEVICE — WORKING LENGTH 235CM, WORKING CHANNEL 2.8MM
Type: IMPLANTABLE DEVICE | Site: DESCENDING COLON | Status: FUNCTIONAL
Brand: RESOLUTION 360 CLIP

## 2023-08-11 RX ORDER — SODIUM CHLORIDE 0.9 % (FLUSH) 0.9 %
5-40 SYRINGE (ML) INJECTION PRN
Status: DISCONTINUED | OUTPATIENT
Start: 2023-08-11 | End: 2023-08-11 | Stop reason: HOSPADM

## 2023-08-11 RX ORDER — SODIUM CHLORIDE 9 MG/ML
25 INJECTION, SOLUTION INTRAVENOUS PRN
Status: DISCONTINUED | OUTPATIENT
Start: 2023-08-11 | End: 2023-08-11 | Stop reason: HOSPADM

## 2023-08-11 RX ORDER — LIDOCAINE HYDROCHLORIDE 20 MG/ML
INJECTION, SOLUTION EPIDURAL; INFILTRATION; INTRACAUDAL; PERINEURAL PRN
Status: DISCONTINUED | OUTPATIENT
Start: 2023-08-11 | End: 2023-08-11 | Stop reason: SDUPTHER

## 2023-08-11 RX ORDER — SODIUM CHLORIDE 0.9 % (FLUSH) 0.9 %
5-40 SYRINGE (ML) INJECTION EVERY 12 HOURS SCHEDULED
Status: DISCONTINUED | OUTPATIENT
Start: 2023-08-11 | End: 2023-08-11 | Stop reason: HOSPADM

## 2023-08-11 RX ORDER — EPINEPHRINE IN SOD CHLOR,ISO 1 MG/10 ML
SYRINGE (ML) INTRAVENOUS PRN
Status: DISCONTINUED | OUTPATIENT
Start: 2023-08-11 | End: 2023-08-11 | Stop reason: ALTCHOICE

## 2023-08-11 RX ADMIN — PROPOFOL 500 MG: 10 INJECTION, EMULSION INTRAVENOUS at 09:55

## 2023-08-11 RX ADMIN — LIDOCAINE HYDROCHLORIDE 40 MG: 20 INJECTION, SOLUTION EPIDURAL; INFILTRATION; INTRACAUDAL; PERINEURAL at 09:16

## 2023-08-11 RX ADMIN — SODIUM CHLORIDE 1000 ML: 9 INJECTION, SOLUTION INTRAVENOUS at 09:07

## 2023-08-11 ASSESSMENT — PAIN - FUNCTIONAL ASSESSMENT: PAIN_FUNCTIONAL_ASSESSMENT: NONE - DENIES PAIN

## 2023-08-11 NOTE — OP NOTE
[address]                  Colonoscopy Operative Report    8/11/2023      Frank Fernando  [id number]  1955    Procedure Type:   Colonoscopy --screening     Indications:    Screening colonoscopy     Pre-operative Diagnosis: see indication above    Post-operative Diagnosis:  See findings below    :  Saranya Estrella MD    Referring Provider: No primary care provider on file. Sedation:  MAC anesthesia Propofol    Pre-Procedural Exam:      Airway: clear,  No airway problems anticipated  Heart: RRR, without gallops or rubs  Lungs: clear bilaterally without wheezes, crackles, or rhonchi  Abdomen: soft, nontender, nondistended, bowel sounds present  Mental Status: awake, alert and oriented to person, place and time     Procedure Details:  After informed consent was obtained with all risks and benefits of procedure explained and preoperative exam completed, the patient was taken to the endoscopy suite and placed in the left lateral decubitus position. Upon sequential sedation as per above, a digital rectal exam was performed . The Olympus videocolonoscope  was inserted in the rectum and carefully advanced to the cecum, which was identified by the ileocecal valve and appendiceal orifice. The cecum was identified by the ileocecal valve and appendiceal orifice. The quality of preparation was good. The colonoscope was slowly withdrawn with careful evaluation between folds. Retroflexion in the rectum was completed demonstrating internal hemorrhoids. Findings:     Rectum: Grade 1 internal hemorrhoid(s); There is a4 to 5 cm multi lobulated villous like polyp starts about 1.5 cm above the dentate line and goes up from there. Very soft. Central depression present, but no ulceration. Took three biopsies of depressed area. Sigmoid: normal    Descending Colon: Two cm pedunculated polyp on thick stalk.  Hot snared and transient bleeding controlled with injection of

## 2023-08-11 NOTE — PROGRESS NOTES
Endoscopy Case End Note:     Procedure scope was pre-cleaned, per protocol, at bedside by Mitul Decker. Report received from anesthesia. See anesthesia flowsheet for intra-procedure vital signs and events. Belongings remain under stretcher with patient.

## 2023-08-11 NOTE — DISCHARGE INSTRUCTIONS
Nolberto Collier MD  [facility], [address] [facility], [address] [address] [place]  [phone]  [email or web address] [facility] [facility]    Frank Morrissey  [id number]  1955    COLON DISCHARGE INSTRUCTIONS  Discomfort:  Redness at IV site- apply warm compress to area; if redness or soreness persist- contact your physician  There may be a slight amount of blood passed from the rectum  Gaseous discomfort- walking, belching will help relieve any discomfort  You may not operate a vehicle for 12 hours  You may not engage in an occupation involving machinery or appliances for rest of today  You may not drink alcoholic beverages for at least 12 hours  Avoid making any critical decisions for at least 24 hour  DIET:   High fiber diet. - however -  remember your colon is empty and a heavy meal will produce gas. Avoid these foods:  vegetables, fried / greasy foods, carbonated drinks for today      ACTIVITY:  You may resume your normal daily activities it is recommended that you spend the remainder of the day resting -  avoid any strenuous activity. CALL M.D. ANY SIGN OF:   Increasing pain, nausea, vomiting  Abdominal distension (swelling)  New increased bleeding (oral or rectal)  Fever (chills)  Pain in chest area  Bloody discharge from nose or mouth  Shortness of breath     COLONOSCOPY FINDINGS:  Your colonoscopy showed: a large polyp in the descending colon which was removed. But there is a very large polyp in the rectum that was biopsied and not removed. Depending on biopsy results will discuss how to removed that rectal polyp. Follow-up Instructions:   Call Dr. Nolberto Collier if any questions or problems. Telephone # [phone]  Dr. Jessy Velazco office will notify you of the biopsy results within 7 to 10 days.   Should have a repeat colonoscopy in 6 months     Patient Education on Sedation / Analgesia Administered for Procedure      For 24 hours after general

## 2023-08-11 NOTE — ANESTHESIA POSTPROCEDURE EVALUATION
Department of Anesthesiology  Postprocedure Note    Patient: Beto Gudino  MRN: [id number]  YOB: 1955  Date of evaluation: 8/11/2023      Procedure Summary     Date: 08/11/23 Room / Location: [facility] ENDO 01 / [facility] ENDOSCOPY    Anesthesia Start: 6354 Anesthesia Stop: 5808    Procedure: COLONOSCOPY WITH BIOPSY/POLYP (Lower GI Region) Diagnosis:       Screening for colon cancer      (Screening for colon cancer [Z12.11])    Surgeons: Adri Pemberton MD Responsible Provider: Lesvia Francois DO    Anesthesia Type: TIVA ASA Status: 3          Anesthesia Type: TIVA    Baldemar Phase I: Baldemar Score: 10    Baldemar Phase II: Baldemar Score: 10      Anesthesia Post Evaluation    Patient location during evaluation: PACU  Patient participation: complete - patient participated  Level of consciousness: awake  Airway patency: patent  Nausea & Vomiting: no vomiting  Complications: no  Cardiovascular status: hemodynamically stable  Respiratory status: acceptable  Hydration status: euvolemic

## 2023-09-18 ENCOUNTER — OFFICE VISIT (OUTPATIENT)
Age: 68
End: 2023-09-18
Payer: MEDICARE

## 2023-09-18 VITALS
DIASTOLIC BLOOD PRESSURE: 89 MMHG | WEIGHT: 315 LBS | SYSTOLIC BLOOD PRESSURE: 155 MMHG | OXYGEN SATURATION: 96 % | TEMPERATURE: 98.6 F | BODY MASS INDEX: 50.62 KG/M2 | HEART RATE: 64 BPM | HEIGHT: 66 IN

## 2023-09-18 DIAGNOSIS — G47.33 OBSTRUCTIVE SLEEP APNEA (ADULT) (PEDIATRIC): Primary | ICD-10-CM

## 2023-09-18 DIAGNOSIS — I10 PRIMARY HYPERTENSION: ICD-10-CM

## 2023-09-18 DIAGNOSIS — E66.01 OBESITY, MORBID, BMI 50 OR HIGHER (HCC): ICD-10-CM

## 2023-09-18 PROCEDURE — 3079F DIAST BP 80-89 MM HG: CPT | Performed by: INTERNAL MEDICINE

## 2023-09-18 PROCEDURE — 1123F ACP DISCUSS/DSCN MKR DOCD: CPT | Performed by: INTERNAL MEDICINE

## 2023-09-18 PROCEDURE — 3077F SYST BP >= 140 MM HG: CPT | Performed by: INTERNAL MEDICINE

## 2023-09-18 PROCEDURE — 99204 OFFICE O/P NEW MOD 45 MIN: CPT | Performed by: INTERNAL MEDICINE

## 2023-09-18 ASSESSMENT — SLEEP AND FATIGUE QUESTIONNAIRES
HOW LIKELY ARE YOU TO NOD OFF OR FALL ASLEEP WHILE SITTING QUIETLY AFTER LUNCH WITHOUT ALCOHOL: 0
HOW LIKELY ARE YOU TO NOD OFF OR FALL ASLEEP WHILE LYING DOWN TO REST IN THE AFTERNOON WHEN CIRCUMSTANCES PERMIT: 0
ESS TOTAL SCORE: 4
HOW LIKELY ARE YOU TO NOD OFF OR FALL ASLEEP IN A CAR, WHILE STOPPED FOR A FEW MINUTES IN TRAFFIC: 0
HOW LIKELY ARE YOU TO NOD OFF OR FALL ASLEEP WHILE SITTING AND READING: 1
HOW LIKELY ARE YOU TO NOD OFF OR FALL ASLEEP WHILE SITTING AND TALKING TO SOMEONE: 1
HOW LIKELY ARE YOU TO NOD OFF OR FALL ASLEEP WHILE WATCHING TV: 1
HOW LIKELY ARE YOU TO NOD OFF OR FALL ASLEEP WHILE SITTING INACTIVE IN A PUBLIC PLACE: 0
HOW LIKELY ARE YOU TO NOD OFF OR FALL ASLEEP WHEN YOU ARE A PASSENGER IN A CAR FOR AN HOUR WITHOUT A BREAK: 1

## 2023-09-18 NOTE — PATIENT INSTRUCTIONS
[address] [facility].[place], [address]  Tel.  [phone]  Fax. [address] [facility], [facility] [place]  Tel.  [phone]  Fax. [phone] [facility] [address] [facility]  Tel.  [phone]  Fax. [phone]     Sleep Apnea: After Your Visit  Your Care Instructions  Sleep apnea occurs when you frequently stop breathing for 10 seconds or longer during sleep. It can be mild to severe, based on the number of times per hour that you stop breathing or have slowed breathing. Blocked or narrowed airways in your nose, mouth, or throat can cause sleep apnea. Your airway can become blocked when your throat muscles and tongue relax during sleep. Sleep apnea is common, occurring in 1 out of 20 individuals. Individuals having any of the following characteristics should be evaluated and treated right away due to high risk and detrimental consequences from untreated sleep apnea:  Obesity  Congestive Heart failure  Atrial Fibrillation  Uncontrolled Hypertension  Type II Diabetes  Night-time Arrhythmias  Stroke  Pulmonary Hypertension  High-risk Driving Populations (pilots, truck drivers, etc.)  Patients Considering Weight-loss Surgery    How do you know you have sleep apnea? You probably have sleep apnea if you answer 'yes' to 3 or more of the following questions:  S - Have you been told that you Snore? T - Are you often Tired during the day? O - Has anyone Observed you stop breathing while sleeping? P- Do you have (or are being treated for) high blood Pressure? B - Are you obese (Body Mass Index > 35)? A - Is your Age 48years old or older? N - Is your Neck size greater than 16 inches? G - Are you male Gender? A sleep physician can prescribe a breathing device that prevents tissues in the throat from blocking your airway. Or your doctor may recommend using a dental device (oral breathing device) to help keep your airway open.  In some cases, surgery may

## 2023-09-18 NOTE — PROGRESS NOTES
[address] [facility] [place], [address]  Tel.  [phone]  Fax. [address] [facility], [address] [place] [address]  Tel.  [phone]  Fax. [phone] [facility], [address] [facility]  Tel.  [phone]  Fax. [phone]         Subjective:      Frank Rice is an 80 y.o. male referred by SOBIA Amaya  for evaluation for a sleep disorder. He complains of snoring, snorting, periods of not breathing associated with blood pressure has been higher. Symptoms began several years ago, unchanged since that time. He usually can fall asleep in 5-10 minutes. Family or house members note snoring, choking, periods of not breathing. He denies falling asleep while driving. Frank Causey does not wake up frequently at night. He is not bothered by waking up too early and left unable to get back to sleep. He actually sleeps about 7 hours at night and wakes up about 2 times during the night. He does not work shifts: .   Frank indicates he does get too little sleep at night. His bedtime is 2200. He awakens at 0200. He does not take naps. . He has the following observed behaviors:  ; Other (use comments).   Other remarks: SNORING,TWITCHING OF LEGS OR FEET, VIVID DREAMS        9/18/2023     9:37 AM   Sleep Medicine   Sitting and reading 1   Watching TV 1   Sitting, inactive in a public place (e.g. a theatre or a meeting) 0   As a passenger in a car for an hour without a break 1   Lying down to rest in the afternoon when circumstances permit 0   Sitting and talking to someone 1   Sitting quietly after a lunch without alcohol 0   In a car, while stopped for a few minutes in traffic 0   [place] Sleepiness Score 4       No Known Allergies      Current Outpatient Medications:     cloNIDine (CATAPRES) 0.1 MG tablet, Take 1 tablet by mouth nightly, Disp: , Rfl:     DULoxetine (CYMBALTA) 30 MG extended release capsule, Take 1 capsule by mouth as needed, Disp: ,

## 2023-09-25 ENCOUNTER — HOSPITAL ENCOUNTER (OUTPATIENT)
Facility: HOSPITAL | Age: 68
Setting detail: OUTPATIENT SURGERY
Discharge: HOME | End: 2023-09-25
Attending: INTERNAL MEDICINE | Admitting: INTERNAL MEDICINE
Payer: COMMERCIAL

## 2023-09-25 ENCOUNTER — ANESTHESIA EVENT (OUTPATIENT)
Facility: HOSPITAL | Age: 68
End: 2023-09-25
Payer: COMMERCIAL

## 2023-09-25 ENCOUNTER — APPOINTMENT (OUTPATIENT)
Facility: HOSPITAL | Age: 68
End: 2023-09-25
Attending: INTERNAL MEDICINE
Payer: COMMERCIAL

## 2023-09-25 ENCOUNTER — ANESTHESIA (OUTPATIENT)
Facility: HOSPITAL | Age: 68
End: 2023-09-25
Payer: COMMERCIAL

## 2023-09-25 VITALS
SYSTOLIC BLOOD PRESSURE: 175 MMHG | HEART RATE: 81 BPM | DIASTOLIC BLOOD PRESSURE: 95 MMHG | WEIGHT: 315 LBS | HEIGHT: 66 IN | RESPIRATION RATE: 20 BRPM | TEMPERATURE: 98 F | OXYGEN SATURATION: 93 % | BODY MASS INDEX: 50.62 KG/M2

## 2023-09-25 PROCEDURE — 3700000001 HC ADD 15 MINUTES (ANESTHESIA): Performed by: INTERNAL MEDICINE

## 2023-09-25 PROCEDURE — 2500000003 HC RX 250 WO HCPCS: Performed by: NURSE ANESTHETIST, CERTIFIED REGISTERED

## 2023-09-25 PROCEDURE — 3600007512: Performed by: INTERNAL MEDICINE

## 2023-09-25 PROCEDURE — 7100000010 HC PHASE II RECOVERY - FIRST 15 MIN: Performed by: INTERNAL MEDICINE

## 2023-09-25 PROCEDURE — 3600007502: Performed by: INTERNAL MEDICINE

## 2023-09-25 PROCEDURE — 6360000002 HC RX W HCPCS: Performed by: NURSE ANESTHETIST, CERTIFIED REGISTERED

## 2023-09-25 PROCEDURE — 7100000011 HC PHASE II RECOVERY - ADDTL 15 MIN: Performed by: INTERNAL MEDICINE

## 2023-09-25 PROCEDURE — 2580000003 HC RX 258: Performed by: INTERNAL MEDICINE

## 2023-09-25 PROCEDURE — 3700000000 HC ANESTHESIA ATTENDED CARE: Performed by: INTERNAL MEDICINE

## 2023-09-25 PROCEDURE — C1726 CATH, BAL DIL, NON-VASCULAR: HCPCS | Performed by: INTERNAL MEDICINE

## 2023-09-25 RX ORDER — SODIUM CHLORIDE 0.9 % (FLUSH) 0.9 %
5-40 SYRINGE (ML) INJECTION PRN
Status: DISCONTINUED | OUTPATIENT
Start: 2023-09-25 | End: 2023-09-25 | Stop reason: HOSPADM

## 2023-09-25 RX ORDER — LIDOCAINE HYDROCHLORIDE 20 MG/ML
INJECTION, SOLUTION EPIDURAL; INFILTRATION; INTRACAUDAL; PERINEURAL PRN
Status: DISCONTINUED | OUTPATIENT
Start: 2023-09-25 | End: 2023-09-25 | Stop reason: SDUPTHER

## 2023-09-25 RX ORDER — SODIUM CHLORIDE 9 MG/ML
25 INJECTION, SOLUTION INTRAVENOUS PRN
Status: DISCONTINUED | OUTPATIENT
Start: 2023-09-25 | End: 2023-09-25 | Stop reason: HOSPADM

## 2023-09-25 RX ORDER — SODIUM CHLORIDE 9 MG/ML
INJECTION, SOLUTION INTRAVENOUS CONTINUOUS
Status: DISCONTINUED | OUTPATIENT
Start: 2023-09-25 | End: 2023-09-25 | Stop reason: HOSPADM

## 2023-09-25 RX ORDER — SODIUM CHLORIDE 0.9 % (FLUSH) 0.9 %
5-40 SYRINGE (ML) INJECTION EVERY 12 HOURS SCHEDULED
Status: DISCONTINUED | OUTPATIENT
Start: 2023-09-25 | End: 2023-09-25 | Stop reason: HOSPADM

## 2023-09-25 RX ADMIN — LIDOCAINE HYDROCHLORIDE 60 MG: 20 INJECTION, SOLUTION EPIDURAL; INFILTRATION; INTRACAUDAL; PERINEURAL at 14:40

## 2023-09-25 RX ADMIN — PROPOFOL 50 MG: 10 INJECTION, EMULSION INTRAVENOUS at 14:49

## 2023-09-25 RX ADMIN — PROPOFOL 50 MG: 10 INJECTION, EMULSION INTRAVENOUS at 14:53

## 2023-09-25 RX ADMIN — SODIUM CHLORIDE: 9 INJECTION, SOLUTION INTRAVENOUS at 14:28

## 2023-09-25 RX ADMIN — PROPOFOL 75 MG: 10 INJECTION, EMULSION INTRAVENOUS at 14:40

## 2023-09-25 RX ADMIN — PROPOFOL 50 MG: 10 INJECTION, EMULSION INTRAVENOUS at 15:01

## 2023-09-25 RX ADMIN — PROPOFOL 50 MG: 10 INJECTION, EMULSION INTRAVENOUS at 14:45

## 2023-09-25 RX ADMIN — PROPOFOL 50 MG: 10 INJECTION, EMULSION INTRAVENOUS at 14:57

## 2023-09-25 RX ADMIN — PROPOFOL 75 MG: 10 INJECTION, EMULSION INTRAVENOUS at 14:42

## 2023-09-25 ASSESSMENT — PAIN - FUNCTIONAL ASSESSMENT: PAIN_FUNCTIONAL_ASSESSMENT: NONE - DENIES PAIN

## 2023-09-25 NOTE — DISCHARGE INSTRUCTIONS
[address] [facility], [address] [place] [address]    RECTAL EUS DISCHARGE INSTRUCTIONS    Cris Ross  [id number]  1955    Discomfort:  Redness at IV site- apply warm compress to area; if redness or soreness persist- contact your physician  There may be a slight amount of blood passed from the rectum  Gaseous discomfort- walking, belching will help relieve any discomfort  You may not operate a vehicle for 12 hours  You may not engage in an occupation involving machinery or appliances for rest of today  You may not drink alcoholic beverages for at least 12 hours  Avoid making any critical decisions for at least 24 hour  DIET:  You may resume your regular diet - however -  remember your colon is empty and a heavy meal will produce gas. Avoid these foods:  vegetables, fried / greasy foods, carbonated drinks     ACTIVITY:  You may  resume your normal daily activities it is recommended that you spend the remainder of the day resting -  avoid any strenuous activity. CALL M.D. ANY SIGN OF:   Increasing pain, nausea, vomiting  Abdominal distension (swelling)  New increased bleeding (oral or rectal)  Fever (chills)  Pain in chest area  Bloody discharge from nose or mouth  Shortness of breath      Follow-up Instructions:   Call Dr. Gerhardt Fick for any questions or problems at [phone]          ENDOSCOPY FINDINGS:   Your rectal endoscopic ultrasound showed that the rectal polyp is of a size and appearance that should be amenable to removal with endoscopic submucosal dissection. We will contact you about scheduling this procedure.   Telephone # [phone]      Signed By: Gerhardt Fick, MD     9/25/2023  3:10 PM

## 2023-09-25 NOTE — OP NOTE
[facility]  [place], [address] [facility]       Flexible Sigmoidoscopy with Rectal Endoscopic Ultrasound     NAME:  Frank Chance   :   1955   MRN:   [id number]       Procedure Name: Flexible Sigmoidoscopy with Rectal Endoscopic Ultrasound     Indications: rectal polyp    : Susan Nevarez MD    Staff: Circulator: Alonzo Dick RN  Endoscopy Technician: Cristiano Camara     Referring Provider: Seamus Baugh MD, -Lelia Martin, ROSA MARIA - NP        Anethesia/Sedation: MAC      Procedure Details   After informed consent was obtained for the procedure, with all risks and benefits of procedure explained the patient was taken to the endoscopy suite and placed in the left lateral decubitus position. Initially, a flexible sigmoidoscopy was performed. Findings are described below. Next, passed rectal radial echoendoscope was passed to 25 cm. The iliac vessels were seen and were normal, and there was no adenopathy appreciated. The patient tolerated the procedure well. Findings: the previously described rectal polyp was seen the distal rectum. This was approximately 1.5 cm above the dentate line. The polyp appears to arise from a broad stalk. The polyp is approximately 3.5-4.0 cm and has a bulky, polypoid component. Previous biopsies consistent with tubular adenoma. On radial rectal ultrasound, the polyp appears to be contained to the mucosa/muscularis mucosa; however, there were portions where it was difficult to localize the submucosa due to the bulky, polypoid nature. The muscularis propria appeared largely preserved. No pathologically enlarged lymph nodes were seen. Specimen Removed:  none  Complications: None. EBL:  None. Impression:    1. Distal rectal polyp    Recommendations:     1. Rectal ESD to be scheduled. Would consider pre-cut ESD vs hybrid ESD considering the broad stalk and bulky,polypoid nature of the polyp.     Signed By: Anika Rodriguez Eli Issa MD     9/25/2023  3:12 PM

## 2023-09-25 NOTE — H&P
[facility]  [address], [facility]        History and Physical       NAME:  Kadi Sanchez   :   1955   MRN:   [id number]             History of Present Illness:  Patient is a 80 y.o. who is seen for rectal polyp. PMH:  Past Medical History:   Diagnosis Date    Arthritis     fingers    At risk for sleep apnea     Evalutaion 2023    Hypertension        PSH:  Past Surgical History:   Procedure Laterality Date    COLONOSCOPY N/A 2023    COLONOSCOPY WITH BIOPSY/POLYP performed by Alyson Belcher MD at [facility] ENDOSCOPY    HYDROCELE EXCISION         Allergies:  No Known Allergies    Home Medications:  Prior to Admission Medications   Prescriptions Last Dose Informant Patient Reported? Taking?    DULoxetine (CYMBALTA) 30 MG extended release capsule   Yes No   Sig: Take 1 capsule by mouth as needed   cloNIDine (CATAPRES) 0.1 MG tablet 2023  Yes No   Sig: Take 1 tablet by mouth nightly   furosemide (LASIX) 40 MG tablet 2023  Yes No   Sig: Take 1 tablet by mouth daily   lisinopril (PRINIVIL;ZESTRIL) 40 MG tablet   Yes No   Sig: Take 1 tablet by mouth daily   metoprolol succinate (TOPROL XL) 50 MG extended release tablet 2023  Yes No   Sig: Take 1 tablet by mouth daily      Facility-Administered Medications: None       Hospital Medications:  Current Facility-Administered Medications   Medication Dose Route Frequency    0.9 % sodium chloride infusion   IntraVENous Continuous    sodium chloride flush 0.9 % injection 5-40 mL  5-40 mL IntraVENous 2 times per day    sodium chloride flush 0.9 % injection 5-40 mL  5-40 mL IntraVENous PRN    0.9 % sodium chloride infusion  25 mL IntraVENous PRN       Social History:  Social History     Tobacco Use    Smoking status: Never    Smokeless tobacco: Never   Substance Use Topics    Alcohol use: Never       Family History:  Family History   Problem Relation Age of Onset    Cancer Brother     Heart Disease

## 2023-09-25 NOTE — ANESTHESIA POSTPROCEDURE EVALUATION
Department of Anesthesiology  Postprocedure Note    Patient: Chaz Llanes  MRN: [id number]  YOB: 1955  Date of evaluation: 9/25/2023      Procedure Summary     Date: 09/25/23 Room / Location: [facility] ENDO [address] [place] [address] ENDOSCOPY    Anesthesia Start: 3041 Anesthesia Stop: 6222    Procedures:        FLEXIBLE SIGMOIDOSCOPY      ENDOSCOPIC ULTRASOUND Diagnosis:       Rectal polyp      (Rectal polyp [K62.1])    Surgeons: Chinmay Lucia MD Responsible Provider: Patrizia Cano DO    Anesthesia Type: MAC ASA Status: 3          Anesthesia Type: MAC    Baldemar Phase I: Baldemar Score: 10    Baldemar Phase II: Baldemar Score: 10      Anesthesia Post Evaluation    Patient location during evaluation: bedside  Patient participation: complete - patient participated  Level of consciousness: awake and alert  Pain score: 0  Airway patency: patent  Nausea & Vomiting: no nausea and no vomiting  Complications: no  Cardiovascular status: hemodynamically stable and blood pressure returned to baseline  Respiratory status: room air  Hydration status: euvolemic  Pain management: adequate

## 2023-10-05 NOTE — PROGRESS NOTES
[address]  In Office FOLLOW-UP VISIT         Frank Sutherland is a 80 y.o. male who presents today for the following:  Chief Complaint   Patient presents with    Follow-up     Pt states he is expiericing burning and swelling from his knee to his          ASSESSMENT AND PLAN  Patient is known to this practice. This is my first time seeing the patient. Chart and history reviewed in detail at today's office visit. 1. Polyneuropathy, unspecified  Assessment & Plan:   Patient verbalized he continues to experience paresthesias in bilateral lower extremity. He was prescribed duloxetine 30 mg twice a day which alleviate his pain and help him sleep at night but he ran out of prescription. EMG completed on 4/13/2022 showed no clear evidence of lumbar radiculopathy or perineal neuropathy. We will restart him on duloxetine 30 mg twice a day to see if that helps. He also has been followed at the neuropathy centers and has been receiving injections in the legs. He has 5 session of injections left as of today. Skin biopsy option was discussed with patient today to rule out possible small fiber neuropathy but deferred at this time to see if he would have any relief taking these injections. Patient was encouraged to remain active. Adopt healthy lifestyles which included nutrition and exercise to help alleviating the symptoms. A copy of the Mediterranean diet was provided to patient. Patient requested to have a placard card for parking due to difficulty walking. Paperwork was filled out and given to patient today. Fall safety was extensively discussed with patient and family today. He is to continue to follow-up with PCP and other specialists for other comorbid conditions as appropriate. Orders:  -     DULoxetine (CYMBALTA) 30 MG extended release capsule; Take 1 capsule by mouth 2 times daily, Disp-180 capsule, R-4Normal  2.  Carpal tunnel syndrome of

## 2023-10-06 ENCOUNTER — OFFICE VISIT (OUTPATIENT)
Age: 68
End: 2023-10-06
Payer: COMMERCIAL

## 2023-10-06 VITALS
TEMPERATURE: 98.2 F | DIASTOLIC BLOOD PRESSURE: 66 MMHG | HEIGHT: 66 IN | BODY MASS INDEX: 50.62 KG/M2 | OXYGEN SATURATION: 97 % | SYSTOLIC BLOOD PRESSURE: 144 MMHG | HEART RATE: 65 BPM | RESPIRATION RATE: 18 BRPM | WEIGHT: 315 LBS

## 2023-10-06 DIAGNOSIS — G62.9 POLYNEUROPATHY, UNSPECIFIED: Primary | ICD-10-CM

## 2023-10-06 DIAGNOSIS — R60.9 PERIPHERAL EDEMA: ICD-10-CM

## 2023-10-06 DIAGNOSIS — G56.01 CARPAL TUNNEL SYNDROME OF RIGHT WRIST: ICD-10-CM

## 2023-10-06 DIAGNOSIS — G47.9 SLEEPING DIFFICULTY: ICD-10-CM

## 2023-10-06 PROCEDURE — 1123F ACP DISCUSS/DSCN MKR DOCD: CPT

## 2023-10-06 PROCEDURE — 99215 OFFICE O/P EST HI 40 MIN: CPT

## 2023-10-06 PROCEDURE — 3078F DIAST BP <80 MM HG: CPT

## 2023-10-06 PROCEDURE — 3077F SYST BP >= 140 MM HG: CPT

## 2023-10-06 RX ORDER — DULOXETIN HYDROCHLORIDE 30 MG/1
30 CAPSULE, DELAYED RELEASE ORAL 2 TIMES DAILY
Qty: 180 CAPSULE | Refills: 4 | Status: SHIPPED | OUTPATIENT
Start: 2023-10-06

## 2023-10-06 RX ORDER — OLMESARTAN MEDOXOMIL AND HYDROCHLOROTHIAZIDE 40/12.5 40; 12.5 MG/1; MG/1
1 TABLET ORAL DAILY
COMMUNITY

## 2023-10-06 RX ORDER — EZETIMIBE 10 MG/1
10 TABLET ORAL DAILY
COMMUNITY

## 2023-10-06 ASSESSMENT — PATIENT HEALTH QUESTIONNAIRE - PHQ9
SUM OF ALL RESPONSES TO PHQ QUESTIONS 1-9: 0
SUM OF ALL RESPONSES TO PHQ9 QUESTIONS 1 & 2: 0
SUM OF ALL RESPONSES TO PHQ QUESTIONS 1-9: 0
1. LITTLE INTEREST OR PLEASURE IN DOING THINGS: 0
2. FEELING DOWN, DEPRESSED OR HOPELESS: 0
SUM OF ALL RESPONSES TO PHQ QUESTIONS 1-9: 0
SUM OF ALL RESPONSES TO PHQ QUESTIONS 1-9: 0

## 2023-10-06 ASSESSMENT — ENCOUNTER SYMPTOMS
ALLERGIC/IMMUNOLOGIC NEGATIVE: 1
GASTROINTESTINAL NEGATIVE: 1
EYES NEGATIVE: 1
SHORTNESS OF BREATH: 1

## 2023-10-06 NOTE — PATIENT INSTRUCTIONS
As per our discussion,    Continue Cymbalta 30 mg 2 times a day for your neuropathy. I am glad you are experiencing any symptoms with your carpal tunnel. I would encourage you to reach out with your primary care provider with a shortness of breath, swelling in both lower extremity. Please take your Lasix as prescribed. I would encourage you to take your time when walking on switching positions and use your cane to prevent falls. We will help you feel a placard for DMV and you can take that today DMV tomorrow or next week. It was a pleasure meeting you and your wife today. We will see you back in a year or sooner if needed. Please do not hesitate to reach out for any questions or concerns.

## 2023-10-06 NOTE — ASSESSMENT & PLAN NOTE
Patient verbalized experiencing sleeping difficulty. He was scheduled to have a sleep study on 10/24/2023 but preferred to get the kit and perform the procedure at home. He has been followed by Dr. Hitesh Boston. He is to continue to follow-up with pulmonology as appropriate. Sleep hygiene was discussed with patient today.

## 2023-10-06 NOTE — ASSESSMENT & PLAN NOTE
Patient verbalized he continues to experience paresthesias in bilateral lower extremity. He was prescribed duloxetine 30 mg twice a day which alleviate his pain and help him sleep at night but he ran out of prescription. EMG completed on 4/13/2022 showed no clear evidence of lumbar radiculopathy or perineal neuropathy. We will restart him on duloxetine 30 mg twice a day to see if that helps. He also has been followed at the neuropathy centers and has been receiving injections in the legs. He has 5 session of injections left as of today. Skin biopsy option was discussed with patient today to rule out possible small fiber neuropathy but deferred at this time to see if he would have any relief taking these injections. Patient was encouraged to remain active. Adopt healthy lifestyles which included nutrition and exercise to help alleviating the symptoms. A copy of the Mediterranean diet was provided to patient. Patient requested to have a placard card for parking due to difficulty walking. Paperwork was filled out and given to patient today. Fall safety was extensively discussed with patient and family today. He is to continue to follow-up with PCP and other specialists for other comorbid conditions as appropriate.

## 2023-10-06 NOTE — ASSESSMENT & PLAN NOTE
Stable    No longer experiencing any paresthesias. He was referred to Ortho for decompression surgery but since patient is no longer experiencing any symptoms, will defer at this time and continue to monitor him for this. Performing hand exercises was discussed with patient today. He verbalized understanding and agreed to plan of care.

## 2023-10-19 ENCOUNTER — TELEPHONE (OUTPATIENT)
Age: 68
End: 2023-10-19

## 2023-10-19 DIAGNOSIS — G47.33 OBSTRUCTIVE SLEEP APNEA (ADULT) (PEDIATRIC): Primary | ICD-10-CM

## 2023-10-27 NOTE — TELEPHONE ENCOUNTER
Ashley report in chart    Lead tech to convey results to patient    The home test report was received and reviewed. It was positive for significant apnea. AHI was 48/hour. .      We had discussed treatment options at initial consultation. Based on the results of the home sleep apnea test, I believe a trial of APAP would be an effective mode of therapy. APAP order attached. he should be seen in the sleep disorder center 4-6 weeks after initiating PAP therapy. Patient should call the office the day he gets set up with new PAP device so we can schedule him for an adherence/compliance visit within 31-90 days of obtaining a new device. Front staff to Order PAP and call patient and let them know which DME company they should be hearing from after results reviewed with lead support technologist.     he will need a first adherence visit.

## 2023-10-30 ENCOUNTER — APPOINTMENT (OUTPATIENT)
Facility: HOSPITAL | Age: 68
End: 2023-10-30
Payer: MEDICARE

## 2023-10-30 ENCOUNTER — HOSPITAL ENCOUNTER (EMERGENCY)
Facility: HOSPITAL | Age: 68
Discharge: HOME | End: 2023-10-30
Attending: EMERGENCY MEDICINE
Payer: MEDICARE

## 2023-10-30 VITALS
OXYGEN SATURATION: 97 % | SYSTOLIC BLOOD PRESSURE: 168 MMHG | HEART RATE: 54 BPM | RESPIRATION RATE: 18 BRPM | DIASTOLIC BLOOD PRESSURE: 96 MMHG | TEMPERATURE: 98.5 F | HEIGHT: 66 IN | BODY MASS INDEX: 50.31 KG/M2 | WEIGHT: 313.05 LBS

## 2023-10-30 DIAGNOSIS — M79.89 RIGHT LEG SWELLING: ICD-10-CM

## 2023-10-30 DIAGNOSIS — I16.0 HYPERTENSIVE URGENCY: Primary | ICD-10-CM

## 2023-10-30 LAB
ECHO BSA: 2.57 M2
EKG ATRIAL RATE: 0 BPM
EKG DIAGNOSIS: NORMAL
EKG Q-T INTERVAL: 0 MS
EKG QRS DURATION: 0 MS
EKG QTC CALCULATION (BAZETT): 0 MS
EKG R AXIS: 0 DEGREES
EKG T AXIS: 0 DEGREES
EKG VENTRICULAR RATE: 0 BPM

## 2023-10-30 PROCEDURE — 93971 EXTREMITY STUDY: CPT

## 2023-10-30 PROCEDURE — 99284 EMERGENCY DEPT VISIT MOD MDM: CPT

## 2023-10-30 PROCEDURE — 93005 ELECTROCARDIOGRAM TRACING: CPT | Performed by: EMERGENCY MEDICINE

## 2023-10-30 PROCEDURE — 6370000000 HC RX 637 (ALT 250 FOR IP): Performed by: EMERGENCY MEDICINE

## 2023-10-30 RX ORDER — LISINOPRIL 40 MG/1
40 TABLET ORAL DAILY
Qty: 30 TABLET | Refills: 5 | Status: SHIPPED | OUTPATIENT
Start: 2023-10-30

## 2023-10-30 RX ORDER — CLONIDINE HYDROCHLORIDE 0.1 MG/1
0.2 TABLET ORAL
Status: COMPLETED | OUTPATIENT
Start: 2023-10-30 | End: 2023-10-30

## 2023-10-30 RX ORDER — LISINOPRIL 20 MG/1
40 TABLET ORAL
Status: COMPLETED | OUTPATIENT
Start: 2023-10-30 | End: 2023-10-30

## 2023-10-30 RX ADMIN — LISINOPRIL 40 MG: 20 TABLET ORAL at 13:34

## 2023-10-30 RX ADMIN — CLONIDINE HYDROCHLORIDE 0.2 MG: 0.1 TABLET ORAL at 13:34

## 2023-10-30 ASSESSMENT — ENCOUNTER SYMPTOMS: SHORTNESS OF BREATH: 0

## 2023-10-30 ASSESSMENT — PAIN SCALES - GENERAL: PAINLEVEL_OUTOF10: 10

## 2023-10-30 NOTE — ED NOTES
DC info reviewed with patient, all questions answered. Patient well-appearing at time of discharge and vital signs stable. Ambulatory out of ED at this time.        Hernandez Sebastian RN  10/30/23 [id number]

## 2023-10-30 NOTE — DISCHARGE INSTRUCTIONS
It was a pleasure taking care of you at [facility] Emergency Department today. We know that when you come to [facility], you are entrusting us with your health, comfort, and safety. Our physicians and nurses honor that trust, and we truly appreciate the opportunity to care for you and your loved ones. We also value your feedback. If you receive a survey about your Emergency Department experience today, please fill it out. We care about our patients' feedback, and we listen to what you have to say. Thank you!

## 2023-10-30 NOTE — ED PROVIDER NOTES
EMERGENCY DEPARTMENT HISTORY AND PHYSICAL EXAM    Date: 10/30/2023  Patient Name: Frank Jiménez  Patient Age and Sex: 80 y.o. male  MRN:  [id number]  CSN:  [id number]    History of Present Illness     Chief Complaint   Patient presents with    Leg Swelling     2 to 3 days of right leg swelling ; pt was a neuropathy clinic today and was told bp was elevated. Pt has had a headache with this-pt had a bp med change about 3 weeks to a month ago. Pt stopped his bp med a few days ago. Hypertension       History Provided By: Patient    Ability to gather history was limited by:     HPI: Frank Causey, 80 y.o. male   With history of morbid obesity, neuropathy, hypertension, complains of painless swelling in his right leg which he thinks is been going on for a few days. He went to his neuropathy clinic today and they noted some swelling in the right leg and referred him to the ED for further evaluation. No pain or new weakness or numbness symptoms. In addition to this his blood pressure has been very high recently and he has had some mild headaches, has been off of his lisinopril and all losartan, only taking metoprolol recently. Tobacco Use      Smoking status: Never      Smokeless tobacco: Never     Past History   The patient's medical, surgical, and social history were reviewed by me today. Current Medications:  No current facility-administered medications on file prior to encounter.      Current Outpatient Medications on File Prior to Encounter   Medication Sig Dispense Refill    ezetimibe (ZETIA) 10 MG tablet Take 1 tablet by mouth daily      olmesartan-hydroCHLOROthiazide (BENICAR HCT) 40-12.5 MG per tablet Take 1 tablet by mouth daily      DULoxetine (CYMBALTA) 30 MG extended release capsule Take 1 capsule by mouth 2 times daily 180 capsule 4    cloNIDine (CATAPRES) 0.1 MG tablet Take 1 tablet by mouth nightly      furosemide (LASIX) 40 MG tablet Take 1 tablet by mouth daily      metoprolol

## 2023-11-02 ENCOUNTER — CLINICAL DOCUMENTATION (OUTPATIENT)
Age: 68
End: 2023-11-02

## 2024-07-16 ASSESSMENT — ENCOUNTER SYMPTOMS
GASTROINTESTINAL NEGATIVE: 1
SHORTNESS OF BREATH: 1
EYES NEGATIVE: 1
ALLERGIC/IMMUNOLOGIC NEGATIVE: 1

## 2024-07-17 ENCOUNTER — OFFICE VISIT (OUTPATIENT)
Age: 69
End: 2024-07-17
Payer: MEDICARE

## 2024-07-17 VITALS
RESPIRATION RATE: 19 BRPM | HEIGHT: 66 IN | BODY MASS INDEX: 50.62 KG/M2 | DIASTOLIC BLOOD PRESSURE: 84 MMHG | HEART RATE: 77 BPM | SYSTOLIC BLOOD PRESSURE: 136 MMHG | OXYGEN SATURATION: 96 % | TEMPERATURE: 97.9 F | WEIGHT: 315 LBS

## 2024-07-17 DIAGNOSIS — G62.9 POLYNEUROPATHY, UNSPECIFIED: Primary | ICD-10-CM

## 2024-07-17 DIAGNOSIS — G47.33 SEVERE OBSTRUCTIVE SLEEP APNEA: ICD-10-CM

## 2024-07-17 DIAGNOSIS — G56.01 CARPAL TUNNEL SYNDROME OF RIGHT WRIST: ICD-10-CM

## 2024-07-17 DIAGNOSIS — Z91.89 STROKE RISK: ICD-10-CM

## 2024-07-17 DIAGNOSIS — R60.0 PERIPHERAL EDEMA: ICD-10-CM

## 2024-07-17 PROCEDURE — 3075F SYST BP GE 130 - 139MM HG: CPT

## 2024-07-17 PROCEDURE — 99215 OFFICE O/P EST HI 40 MIN: CPT

## 2024-07-17 PROCEDURE — 3079F DIAST BP 80-89 MM HG: CPT

## 2024-07-17 PROCEDURE — 1123F ACP DISCUSS/DSCN MKR DOCD: CPT

## 2024-07-17 RX ORDER — CARBAMAZEPINE 100 MG/1
100 TABLET, EXTENDED RELEASE ORAL 2 TIMES DAILY
Qty: 60 TABLET | Refills: 3 | Status: SHIPPED | OUTPATIENT
Start: 2024-07-17

## 2024-07-17 RX ORDER — AMLODIPINE BESYLATE 5 MG/1
5 TABLET ORAL DAILY
COMMUNITY

## 2024-07-17 ASSESSMENT — PATIENT HEALTH QUESTIONNAIRE - PHQ9
SUM OF ALL RESPONSES TO PHQ QUESTIONS 1-9: 0
SUM OF ALL RESPONSES TO PHQ QUESTIONS 1-9: 0
1. LITTLE INTEREST OR PLEASURE IN DOING THINGS: NOT AT ALL
2. FEELING DOWN, DEPRESSED OR HOPELESS: NOT AT ALL
SUM OF ALL RESPONSES TO PHQ QUESTIONS 1-9: 0
SUM OF ALL RESPONSES TO PHQ QUESTIONS 1-9: 0
SUM OF ALL RESPONSES TO PHQ9 QUESTIONS 1 & 2: 0

## 2024-07-17 ASSESSMENT — ENCOUNTER SYMPTOMS: COLOR CHANGE: 1

## 2024-07-17 NOTE — ASSESSMENT & PLAN NOTE
His stroke risk factors include hypertension, dyslipidemia, sleep apnea    Patient is to continue to work to all of his comorbid conditions as managed by PCP and other specialists as appropriate    RECOMMENDATIONS:  - BP goal is less than 140/90  - Goal HbA1c is less than 7.   - LDL less than 70     Education was provided today in regards to risk factors for stroke and lifestyle modifications to help minimize the risk of future stroke.    This included medication compliance, regular follow up with primary care physician,  and healthy lifestyle habits (nutrition/exercise).

## 2024-07-17 NOTE — ASSESSMENT & PLAN NOTE
Swelling has resolved .    Doppler study was ordered at the last visit to rule out any blood clots.  Vascular duplex of the bilateral lower extremity performed on 10/20/2023 showed no evidence of deep vein or superficial vein thrombosis in the right lower extremity.    Today, patient verbalized experiencing multiple spots in bilateral lower extremities for the past 1-2 years .    His current neurological examination reveals discolored round multiple macules in bilateral lower extremities.  Will defer this to his primary care for evaluation and management.    Patient was encouraged to adopt healthy lifestyles which includes nutrition and exercise to help with his overall health.    He verbalized understanding and agreed with plan of care.

## 2024-07-17 NOTE — PROGRESS NOTES
Ambulates using a cane.  Antalgic gait noted due to right lower extremity pain     Reflexes: Decreased throughout    Fall risk assessment  Failed to redirect to the Timeline version of the [facility] SmartLink.      No follow-ups on file.     This medical record was transcribed using an electronic medical records system.  Although proofread, it may and can contain electronic and spelling errors.  Other human spelling and other errors may be present.  Corrections may be executed at a later time.  Please feel free to contact us for any clarifications as needed.         ROSA MARIA Mac - NP

## 2024-07-17 NOTE — ASSESSMENT & PLAN NOTE
Not controlled    Home sleep study completed on 9/2023, he was found to be positive for significant apnea and was recommended PAP machine.     Patient has not seen any improvement since using the CPAP and CPAP has been broken and needed to be repaired.    Patient was advised to continue to work with the personnel who is going to fix the CPAP.  He is to continue to wear his CPAP machine as prescribed.    Weight loss management was also discussed to patient.    Sleep hygiene education was provided.

## 2024-07-17 NOTE — PATIENT INSTRUCTIONS
As per our discussion,    Mr. Marie, I think you will be beneficial for you to start working with your diet and also add some exercise to the lifestyle.  This would alleviate your symptoms.    What I mean by that is to cut all the sugars including the cookies and sodas out of your diet.    Although Cymbalta has not been working for you, at the same time, we do not have any options left.  I encourage you to start taking Cymbalta once a day for 2 weeks and then stop.    I will send in a new prescription for carbamazepine 100 mg in which she can take 1 tablet twice a day.    I encourage you to continue to follow-up with your primary care provider and your other specialist for the comorbid conditions as appropriate.    Please talk to Ms. Buchanan about the spots on your legs.  You may have to see a dermatologist for that    It is always a pleasure to see you and your wife    Will see you back in 6 months or sooner if needed    Please do not hesitate to reach out for any questions or concerns.

## 2024-07-17 NOTE — ASSESSMENT & PLAN NOTE
Not controlled    Patient verbalized he continued to experience paresthesias in bilateral lower extremity, worse on the right .    He has been taking duloxetine and thought duloxetine may worsen his symptoms instead of helping.  Will discontinue duloxetine today.  Patient was advised to start taking duloxetine once a day x 2 weeks then stop.    He has tried multiple medications in the past which included gabapentin, Lyrica, Voltaren gel, Elavil, Tylenol, and Cymbalta.    Will start him on carbamazepine 100 mg twice a day today to see if that helps.  Side effects were reviewed.    Neuropathy education and expectation was thoroughly discussed to patient and family.  He is to adopt healthy lifestyles which includes nutrition and exercise, cutting out carbs and sugar in his diet, and to walk at least 30 minutes/day to help alleviate his neuropathic symptoms.    If his symptoms persist, may repeat EMG to rule out any large fiber neuropathy.  If EMG is negative, may consider a skin biopsy to rule out any small fiber neuropathy.    Fall safety was discussed to patient.

## 2024-07-18 NOTE — ASSESSMENT & PLAN NOTE
Stable without worsening in symptoms.    Referral to orthopedics was made to Dr. Nolberto Pinon during previous office visit for possible decompression surgery, but, patient deferred at this time.      Will continue to monitor patient for this.  If his symptoms worsen, he is to reach out with orthopedics for evaluation and management.

## 2024-08-08 ENCOUNTER — TELEPHONE (OUTPATIENT)
Age: 69
End: 2024-08-08

## 2024-08-08 RX ORDER — DULOXETIN HYDROCHLORIDE 30 MG/1
30 CAPSULE, DELAYED RELEASE ORAL DAILY
COMMUNITY

## 2024-08-08 NOTE — TELEPHONE ENCOUNTER
Spoke with patient.  Verified patient with two patient identifiers.    Advised per Judy Gomez NP, resume Duloxitine 30 mg qd.  Pt has plenty of pills at home, does not need refill.    Patient verbalized understanding.

## 2024-08-08 NOTE — TELEPHONE ENCOUNTER
Spoke with patient.  Verified patient with two patient identifiers.    States 2 wks ago he stopped the Carbamazepine as it caused HA, dizziness and drowsiness.  Symptoms resolves.    Has Duloxetine 30 mg at home and wants to resume.  States this gave him some relief from his leg pains and it did not cause HA.    ? Okay to resume Duloxetine.    Ludna is off until 8-.  Will forward to associate.      Judy Gomez APRN - NIDIA sent to Nora Baker LPN  Caller: Unspecified (Today, 10:34 AM)    Yes, advise to resume the Cymbalta 30mg daily.  EN

## 2024-08-08 NOTE — TELEPHONE ENCOUNTER
Patient called stating that the carBAMazepine (TEGRETOL-XR) 100 MG extended release tablet  is causing him to have headaches. He would like to discuss this with the nurse. Please call him at [phone]

## 2024-10-31 ENCOUNTER — TELEPHONE (OUTPATIENT)
Age: 69
End: 2024-10-31

## 2024-11-05 ENCOUNTER — OFFICE VISIT (OUTPATIENT)
Age: 69
End: 2024-11-05
Payer: MEDICARE

## 2024-11-05 VITALS
OXYGEN SATURATION: 95 % | SYSTOLIC BLOOD PRESSURE: 141 MMHG | BODY MASS INDEX: 50.17 KG/M2 | TEMPERATURE: 97 F | HEIGHT: 66 IN | WEIGHT: 312.2 LBS | DIASTOLIC BLOOD PRESSURE: 71 MMHG | HEART RATE: 62 BPM

## 2024-11-05 DIAGNOSIS — G47.33 OBSTRUCTIVE SLEEP APNEA (ADULT) (PEDIATRIC): Primary | ICD-10-CM

## 2024-11-05 DIAGNOSIS — I10 PRIMARY HYPERTENSION: ICD-10-CM

## 2024-11-05 DIAGNOSIS — E66.01 OBESITY, MORBID, BMI 50 OR HIGHER: ICD-10-CM

## 2024-11-05 PROCEDURE — 99213 OFFICE O/P EST LOW 20 MIN: CPT | Performed by: INTERNAL MEDICINE

## 2024-11-05 PROCEDURE — 1159F MED LIST DOCD IN RCRD: CPT | Performed by: INTERNAL MEDICINE

## 2024-11-05 PROCEDURE — 1123F ACP DISCUSS/DSCN MKR DOCD: CPT | Performed by: INTERNAL MEDICINE

## 2024-11-05 PROCEDURE — 1160F RVW MEDS BY RX/DR IN RCRD: CPT | Performed by: INTERNAL MEDICINE

## 2024-11-05 PROCEDURE — 3077F SYST BP >= 140 MM HG: CPT | Performed by: INTERNAL MEDICINE

## 2024-11-05 PROCEDURE — 3078F DIAST BP <80 MM HG: CPT | Performed by: INTERNAL MEDICINE

## 2024-11-05 ASSESSMENT — SLEEP AND FATIGUE QUESTIONNAIRES
HOW LIKELY ARE YOU TO NOD OFF OR FALL ASLEEP WHILE SITTING AND READING: SLIGHT CHANCE OF DOZING
ESS TOTAL SCORE: 6
HOW LIKELY ARE YOU TO NOD OFF OR FALL ASLEEP WHEN YOU ARE A PASSENGER IN A CAR FOR AN HOUR WITHOUT A BREAK: SLIGHT CHANCE OF DOZING
HOW LIKELY ARE YOU TO NOD OFF OR FALL ASLEEP IN A CAR, WHILE STOPPED FOR A FEW MINUTES IN TRAFFIC: WOULD NEVER DOZE
HOW LIKELY ARE YOU TO NOD OFF OR FALL ASLEEP WHILE WATCHING TV: SLIGHT CHANCE OF DOZING
HOW LIKELY ARE YOU TO NOD OFF OR FALL ASLEEP WHILE LYING DOWN TO REST IN THE AFTERNOON WHEN CIRCUMSTANCES PERMIT: SLIGHT CHANCE OF DOZING
HOW LIKELY ARE YOU TO NOD OFF OR FALL ASLEEP WHILE SITTING INACTIVE IN A PUBLIC PLACE: SLIGHT CHANCE OF DOZING
HOW LIKELY ARE YOU TO NOD OFF OR FALL ASLEEP WHILE SITTING QUIETLY AFTER LUNCH WITHOUT ALCOHOL: SLIGHT CHANCE OF DOZING
HOW LIKELY ARE YOU TO NOD OFF OR FALL ASLEEP WHILE SITTING AND TALKING TO SOMEONE: WOULD NEVER DOZE

## 2024-11-05 NOTE — PATIENT INSTRUCTIONS
[address]  [place] [address]  Tel.  [phone]  Fax. [phone] [address]  [place] [address]  Tel.  [phone]  Fax. [phone] [address] [place] [address]  Tel.  [phone]  Fax. [phone]     PROPER SLEEP HYGIENE    What to avoid  Do not have drinks with caffeine, such as coffee or black tea, for 8 hours before bed.  Do not smoke or use other types of tobacco near bedtime. Nicotine is a stimulant and can keep you awake.  Avoid drinking alcohol late in the evening, because it can cause you to wake in the middle of the night.  Do not eat a big meal close to bedtime. If you are hungry, eat a light snack.  Do not drink a lot of water close to bedtime, because the need to urinate may wake you up during the night.  Do not read or watch TV in bed. Use the bed only for sleeping and sexual activity.  What to try  Go to bed at the same time every night, and wake up at the same time every morning. Do not take naps during the day.  Keep your bedroom quiet, dark, and cool.  Get regular exercise, but not within 3 to 4 hours of your bedtime..  Sleep on a comfortable pillow and mattress.  If watching the clock makes you anxious, turn it facing away from you so you cannot see the time.  If you worry when you lie down, start a worry book. Well before bedtime, write down your worries, and then set the book and your concerns aside.  Try meditation or other relaxation techniques before you go to bed.  If you cannot fall asleep, get up and go to another room until you feel sleepy. Do something relaxing. Repeat your bedtime routine before you go to bed again.  Make your house quiet and calm about an hour before bedtime. Turn down the lights, turn off the TV, log off the computer, and turn down the volume on music. This can help you relax after a busy day.    Drowsy Driving  The [place] National Highway Traffic Safety Administration cites drowsiness as a

## 2024-11-05 NOTE — PROGRESS NOTES
[address]  [place] [address]  Tel.  [phone]  Fax. [phone] [address]  [place] [address]  Tel.  [phone]  Fax. [phone] [address] [place] [address]  Tel.  [phone]  Fax. [phone]     S>Frank Causey is a 69 y.o. male seen for a positive airway pressure follow-up.  He reports no problems using the device.  The following problems are identified:    Drowsiness no Problems exhaling Yes-chest discomfort   Snoring no Forget to put on Yes-discontinued use in May 2024   Mask Comfortable No sleeps much better without it Can't fall asleep no   Dry Mouth no Mask falls off no   Air Leaking yes Frequent awakenings Yes with PAP -gets tangled in hose     No data since 5/30/24  Initial download from [facility] 30 days  Showed 81% adherence, 5.5 hours usage, with 95th percentile pressure at 12.4cmH20  Estimated AHI flow 8/hour on setting 5-15 cmH20, intermittent leak  Weight from last visit 326 lb  He admits that his sleep has worsened.      No Known Allergies    He has a current medication list which includes the following prescription(s): duloxetine, amlodipine, ezetimibe, olmesartan-hydrochlorothiazide, clonidine, furosemide, and metoprolol succinate..      He  has a past medical history of Arthritis, At risk for sleep apnea, and Hypertension.        11/5/2024    10:52 AM 9/18/2023     9:37 AM   Sleep Medicine   Sitting and reading 1 1   Watching TV 1 1   Sitting, inactive in a public place (e.g. a theatre or a meeting) 1 0   As a passenger in a car for an hour without a break 1 1   Lying down to rest in the afternoon when circumstances permit 1 0   Sitting and talking to someone 0 1   Sitting quietly after a lunch without alcohol 1 0   In a car, while stopped for a few minutes in traffic 0 0   D[place]gett Sleepiness Score 6 4    which reflect improved sleep quality over therapy time.    O>    BP (!) 141/71 (Site: Left Upper Arm,

## 2024-12-26 RX ORDER — DULOXETIN HYDROCHLORIDE 30 MG/1
30 CAPSULE, DELAYED RELEASE ORAL DAILY
Qty: 30 CAPSULE | Refills: 11 | Status: SHIPPED | OUTPATIENT
Start: 2024-12-26

## 2024-12-26 NOTE — TELEPHONE ENCOUNTER
Patient is asking if we can refill his duloxetine 30 MG and send it to [facility] in [place]. Thank you.

## 2025-01-22 ENCOUNTER — TELEPHONE (OUTPATIENT)
Age: 70
End: 2025-01-22

## 2025-01-22 NOTE — TELEPHONE ENCOUNTER
Patient called stating that he has seen his PCP, NIDIA Buchanan, and his leg situation has not been improving. Pt stated that he PCP suggested he touch base with his Neurologist about seeing Rheumatology. Please advise. [phone]

## 2025-01-27 DIAGNOSIS — G62.9 POLYNEUROPATHY, UNSPECIFIED: Primary | ICD-10-CM

## 2025-01-27 RX ORDER — DULOXETIN HYDROCHLORIDE 60 MG/1
60 CAPSULE, DELAYED RELEASE ORAL DAILY
Qty: 90 CAPSULE | Refills: 3 | Status: SHIPPED | OUTPATIENT
Start: 2025-01-27

## 2025-01-27 NOTE — PROGRESS NOTES
Patient reach out via portal and verbalized he would like for his duloxetine to be increased given he continued to experience pain.  Will increase duloxetine to 60 mg nightly.  Prescription was sent to pharmacy.

## 2025-01-27 NOTE — TELEPHONE ENCOUNTER
Relayed information to patient,   Patient will reach out to his PCP for the rheumatology   Patient would like to increase the duloxetine medication

## (undated) DEVICE — CONTAINER SPEC 20 ML LID NEUT BUFF FORMALIN 10 % POLYPR STS

## (undated) DEVICE — RETRIEVAL DEVICE: Brand: RESCUENET™

## (undated) DEVICE — Device: Brand: SPOT EX ENDOSCOPIC TATTOO

## (undated) DEVICE — SET GRAV CK VLV NEEDLESS ST 3 GANGED 4WAY STPCOCK HI FLO 10

## (undated) DEVICE — FIAPC® PROBE W/ FILTER 2200 C OD 2.3MM/6.9FR; L 2.2M/7.2FT: Brand: ERBE

## (undated) DEVICE — ELECTRODE PT RET AD L9FT HI MOIST COND ADH HYDRGEL CORDED

## (undated) DEVICE — Device

## (undated) DEVICE — NEEDLE SCLERO 25GA L240CM OD0.51MM ID0.24MM EXTN L4MM SHTH

## (undated) DEVICE — CLIP LIG L235CM RESOL 360 BX/20

## (undated) DEVICE — BALLOON ENDO FOR CLR VISIBILITY OF EUS PROC FITS OLY PENTAX

## (undated) DEVICE — CUFF BLD PRSS AD CLTH SGL TB W/ BAYNT CONN ROUNDED CORNER

## (undated) DEVICE — IV START KIT: Brand: MEDLINE

## (undated) DEVICE — CLIP: Brand: RESOLUTION 360™ ULTRA CLIP

## (undated) DEVICE — TRAP ENDOSCP POLYP 2 CHMBR DRAWER TYP

## (undated) DEVICE — ENDOSCOPIC KIT COMPLIANCE ENDOKIT